# Patient Record
Sex: FEMALE | Race: WHITE | Employment: OTHER | URBAN - METROPOLITAN AREA
[De-identification: names, ages, dates, MRNs, and addresses within clinical notes are randomized per-mention and may not be internally consistent; named-entity substitution may affect disease eponyms.]

---

## 2017-03-31 ENCOUNTER — ED HISTORICAL/CONVERTED ENCOUNTER (OUTPATIENT)
Dept: OTHER | Age: 77
End: 2017-03-31

## 2017-05-26 ENCOUNTER — OP HISTORICAL/CONVERTED ENCOUNTER (OUTPATIENT)
Dept: OTHER | Age: 77
End: 2017-05-26

## 2018-03-12 ENCOUNTER — OP HISTORICAL/CONVERTED ENCOUNTER (OUTPATIENT)
Dept: OTHER | Age: 78
End: 2018-03-12

## 2018-05-07 ENCOUNTER — OP HISTORICAL/CONVERTED ENCOUNTER (OUTPATIENT)
Dept: OTHER | Age: 78
End: 2018-05-07

## 2018-09-04 ENCOUNTER — ED HISTORICAL/CONVERTED ENCOUNTER (OUTPATIENT)
Dept: OTHER | Age: 78
End: 2018-09-04

## 2020-07-27 RX ORDER — NAPROXEN 500 MG/1
500 TABLET, DELAYED RELEASE ORAL 2 TIMES DAILY WITH MEALS
Qty: 180 TAB | Refills: 0 | Status: SHIPPED | OUTPATIENT
Start: 2020-07-27 | End: 2020-10-31

## 2020-07-31 RX ORDER — METOPROLOL SUCCINATE 25 MG/1
TABLET, EXTENDED RELEASE ORAL
Qty: 90 TAB | Refills: 1 | Status: SHIPPED | OUTPATIENT
Start: 2020-07-31 | End: 2021-09-27 | Stop reason: ALTCHOICE

## 2020-08-04 VITALS
BODY MASS INDEX: 33.71 KG/M2 | WEIGHT: 183.2 LBS | DIASTOLIC BLOOD PRESSURE: 68 MMHG | HEIGHT: 62 IN | HEART RATE: 70 BPM | TEMPERATURE: 97.8 F | RESPIRATION RATE: 18 BRPM | SYSTOLIC BLOOD PRESSURE: 167 MMHG | OXYGEN SATURATION: 99 %

## 2020-08-04 PROBLEM — M15.9 DEGENERATIVE JOINT DISEASE INVOLVING MULTIPLE JOINTS: Status: ACTIVE | Noted: 2020-08-04

## 2020-08-04 PROBLEM — S39.012A LOW BACK STRAIN: Status: ACTIVE | Noted: 2020-08-04

## 2020-08-04 PROBLEM — I65.29 CAROTID ARTERY OCCLUSION: Status: ACTIVE | Noted: 2020-08-04

## 2020-08-04 PROBLEM — G62.9 SENSORY NEUROPATHY: Status: ACTIVE | Noted: 2020-08-04

## 2020-08-04 PROBLEM — J30.2 SEASONAL ALLERGIC RHINITIS: Status: ACTIVE | Noted: 2020-08-04

## 2020-08-04 PROBLEM — M54.50 CHRONIC LOW BACK PAIN: Status: ACTIVE | Noted: 2020-08-04

## 2020-08-04 PROBLEM — K57.32 DIVERTICULITIS OF COLON: Status: ACTIVE | Noted: 2020-08-04

## 2020-08-04 PROBLEM — M79.604 LOWER LIMB PAIN, INFERIOR, RIGHT: Status: ACTIVE | Noted: 2020-08-04

## 2020-08-04 PROBLEM — R51.9 HEADACHE: Status: ACTIVE | Noted: 2020-08-04

## 2020-08-04 PROBLEM — I10 ESSENTIAL HYPERTENSION: Status: ACTIVE | Noted: 2020-08-04

## 2020-08-04 PROBLEM — E78.00 PURE HYPERCHOLESTEROLEMIA: Status: ACTIVE | Noted: 2020-08-04

## 2020-08-04 PROBLEM — E55.9 VITAMIN D DEFICIENCY: Status: ACTIVE | Noted: 2020-08-04

## 2020-08-04 PROBLEM — R10.9 ABDOMINAL PAIN: Status: ACTIVE | Noted: 2020-08-04

## 2020-08-04 PROBLEM — E78.5 HYPERLIPIDEMIA: Status: ACTIVE | Noted: 2020-08-04

## 2020-08-04 PROBLEM — G89.29 CHRONIC LOW BACK PAIN: Status: ACTIVE | Noted: 2020-08-04

## 2020-09-23 ENCOUNTER — OFFICE VISIT (OUTPATIENT)
Dept: INTERNAL MEDICINE CLINIC | Age: 80
End: 2020-09-23
Payer: MEDICARE

## 2020-09-23 VITALS
RESPIRATION RATE: 18 BRPM | OXYGEN SATURATION: 96 % | SYSTOLIC BLOOD PRESSURE: 152 MMHG | DIASTOLIC BLOOD PRESSURE: 74 MMHG | TEMPERATURE: 98.3 F | WEIGHT: 169 LBS | HEART RATE: 59 BPM | HEIGHT: 63 IN | BODY MASS INDEX: 29.95 KG/M2

## 2020-09-23 DIAGNOSIS — I10 ESSENTIAL HYPERTENSION: Primary | ICD-10-CM

## 2020-09-23 DIAGNOSIS — R73.9 HYPERGLYCEMIA: ICD-10-CM

## 2020-09-23 DIAGNOSIS — E78.00 PURE HYPERCHOLESTEROLEMIA: ICD-10-CM

## 2020-09-23 PROCEDURE — G8510 SCR DEP NEG, NO PLAN REQD: HCPCS | Performed by: INTERNAL MEDICINE

## 2020-09-23 PROCEDURE — G8536 NO DOC ELDER MAL SCRN: HCPCS | Performed by: INTERNAL MEDICINE

## 2020-09-23 PROCEDURE — 1090F PRES/ABSN URINE INCON ASSESS: CPT | Performed by: INTERNAL MEDICINE

## 2020-09-23 PROCEDURE — 99214 OFFICE O/P EST MOD 30 MIN: CPT | Performed by: INTERNAL MEDICINE

## 2020-09-23 PROCEDURE — 1101F PT FALLS ASSESS-DOCD LE1/YR: CPT | Performed by: INTERNAL MEDICINE

## 2020-09-23 PROCEDURE — G8427 DOCREV CUR MEDS BY ELIG CLIN: HCPCS | Performed by: INTERNAL MEDICINE

## 2020-09-23 PROCEDURE — G8419 CALC BMI OUT NRM PARAM NOF/U: HCPCS | Performed by: INTERNAL MEDICINE

## 2020-09-23 PROCEDURE — G8400 PT W/DXA NO RESULTS DOC: HCPCS | Performed by: INTERNAL MEDICINE

## 2020-09-23 RX ORDER — LOSARTAN POTASSIUM AND HYDROCHLOROTHIAZIDE 12.5; 1 MG/1; MG/1
1 TABLET ORAL DAILY
Qty: 90 TAB | Refills: 1 | Status: SHIPPED | OUTPATIENT
Start: 2020-09-23 | End: 2021-03-18

## 2020-09-23 NOTE — PROGRESS NOTES
Merle Arenas is a 70 y.o. female and presents with Follow Up Chronic Condition; Hypertension; and Cholesterol Problem  Patient doing well follow-up here discussed last labs a year ago glucose 126 creatinine 1.1 normal hepatocellular enzymes cholesterol 247 HDL 33  we discussed low-cholesterol diet TSH 1.5 hemoglobin 11.9 she says she has occasional headaches need a refill of butalbital but otherwise doing well is on gabapentin 300 mg twice a day losartan 100 mg in the morning metoprolol succinate 25 mg a day HCTZ 12.5 mg a day she had either discontinued or stopped the antihypertensive but will restart it blood pressure has been slightly elevated she was try to get off some of her medications advised her to stop the metoprolol succinate 25 mg as she is having no palpitations that she had had in the past is doing better and it did not probably contribute much to her blood pressure but make her little tired we will also stop the sertraline 25 mg a day as its low dose she was taking it when her 's were quite ill in the hospital but she is doing better she had a hip replacement by Dr. Annie Johnson on the right conventional approach and is doing remarkably better no pain or discomfort she is status post back surgery Dr. Chloé Beyer as well and doing well she will try to lose weight diet exercise-she was agreeable for the office phlebotomist draw blood flow studies below lungs clear heart rate regular rate and rhythm overall doing better           Review of Systems  Constitutional: negative for fevers, chills, anorexia, weight loss and fatigue,no insomnia overweight  Eyes:   negative for visual disturbance and irritation, eye discharge, eye pain. no eye redness. ENT:   negative for tinnitus, sore throat, nasal congestion, ear pain, hoarseness, hearing loss.,no snoring.   Respiratory:  negative for cough, hemoptysis, shortness of breath, wheezing,  CV:   negative for chest pain, palpitations, lower extremity edema, shortness of breath while sitting, walking or at night  GI:   negative for nausea, vomiting, diarrhea, abdominal pain,melena,constipation. Endo:               negative for polyuria, polydipsia, polyphagia, cold or heat intolerance,hair loss. Genitourinary: negative for frequency, dysuria and hematuria,urethral discharge,nocturia.straining while urination,urinary incontinence. Integument:  negative for rash and pruritus  Hematologic:  negative for easy bruising and gum/nose bleeding, enlarged nodes  Musculoskel: negative for myalgias, arthralgias, back pain, muscle weakness, joint pain, h/o fall,cramps,calf pain. Degenerative arthritis  Neurological:  negative for headaches, dizziness, vertigo, memory problems, gait and seizures loss of consciousness,no ataxia.   Behavl/Psych: negative for feelings of anxiety, depression, mood changes ,sadness    Past Medical History:   Diagnosis Date    Abdominal pain 8/4/2020    Carotid artery occlusion 8/4/2020    Chronic low back pain 8/4/2020    Degenerative joint disease involving multiple joints 8/4/2020    Diverticulitis of colon 8/4/2020    Essential hypertension 8/4/2020    Headache 8/4/2020    Hyperlipidemia 8/4/2020    Low back strain 8/4/2020    Lower limb pain, inferior, right 8/4/2020    Pure hypercholesterolemia 8/4/2020    Seasonal allergic rhinitis 8/4/2020    Sensory neuropathy 8/4/2020    Vitamin D deficiency 8/4/2020     Past Surgical History:   Procedure Laterality Date    HX COLONOSCOPY  01/22/2013    HX KNEE REPLACEMENT Right 08/01/2016     Social History     Socioeconomic History    Marital status:      Spouse name: Not on file    Number of children: Not on file    Years of education: Not on file    Highest education level: Not on file   Tobacco Use    Smoking status: Never Smoker    Smokeless tobacco: Never Used   Substance and Sexual Activity    Alcohol use: Never     Frequency: Never     Family History   Family history unknown: Yes     Current Outpatient Medications   Medication Sig Dispense Refill    butalb/acetaminophen/caffeine (BUTALBITAL-ACETAMINOPHEN-CAFF PO) Take 1 Tab by mouth two (2) times daily as needed.  losartan-hydroCHLOROthiazide (HYZAAR) 100-12.5 mg per tablet Take 1 Tab by mouth daily for 180 days. 90 Tab 1    metoprolol succinate (TOPROL-XL) 25 mg XL tablet take 1 tablet by mouth once daily 90 Tab 1    naproxen EC (NAPROSYN EC) 500 mg EC tablet Take 1 Tab by mouth two (2) times daily (with meals) for 90 days. 180 Tab 0     Allergies   Allergen Reactions    Darvocet A500 [Propoxyphene N-Acetaminophen] Nausea and Vomiting       Objective:  Visit Vitals  BP (!) 152/74 (BP 1 Location: Right arm, BP Patient Position: Sitting)   Pulse (!) 59   Temp 98.3 °F (36.8 °C) (Oral)   Resp 18   Ht 5' 3\" (1.6 m)   Wt 169 lb (76.7 kg)   SpO2 96%   BMI 29.94 kg/m²       Physical Exam:   Constitutional: General Appearance: healthy-appearing and obese. Level of Distress: NAD. Ambulation: ambulating normally. Overweight  Psychiatric: Mental Status: normal mood and affect and active and alert. Orientation: to time, place, and person. no agitation. ,normal eye contact. normal insight  Head: Head: normocephalic and atraumatic. Eyes: Pupils: PERRLA. Sclerae: non-icteric. ENMT: No lesions on external ear, no hearing loss. No lesions on external nose, sinus tenderness, or nasal discharge. Lips, Teeth, and no mouth or lip ulcers   Neck: Neck: supple, trachea midline, and no masses. Lymph Nodes: no cervical LAD. Thyroid: no enlargement or nodules and non-tender. Lungs: Respiratory effort: no dyspnea. Auscultation: no wheezing, rales/crackles, or rhonchi and breath sounds normal and good air movement. Cardiovascular: Apical Impulse: not displaced. Heart Auscultation: normal S1 and S2; no murmurs, rubs, or gallops; and RRR. Neck vessels: no carotid bruits. Pulses including femoral / pedal: normal throughout. Abdomen:  Bowel Sounds: normal. Inspection and Palpation: no tenderness, guarding, or masses and soft and non-distended. Liver: non-tender and no hepatomegaly. Spleen: non-tender and no splenomegaly. Musculoskeletal[de-identified] Extremities: no edema or varicosities. Calf tenderness. DJD changes  Neurologic: Gait and Station: normal gait and station. Motor Strength normal right and left. Sensory and cerebellar intact. Skin: Inspection and palpation: no rash, lesions, or ulcer. No results found for this or any previous visit. Assessment/Plan:    ICD-10-CM ICD-9-CM    1. Essential hypertension  I10 401.9 CBC WITH AUTOMATED DIFF      METABOLIC PANEL, COMPREHENSIVE   2. Hyperglycemia  R73.9 790.29 HEMOGLOBIN A1C WITH EAG   3. Pure hypercholesterolemia  E78.00 272.0 LIPID PANEL     Orders Placed This Encounter    HEMOGLOBIN A1C WITH EAG    LIPID PANEL    CBC WITH AUTOMATED DIFF    METABOLIC PANEL, COMPREHENSIVE    butalb/acetaminophen/caffeine (BUTALBITAL-ACETAMINOPHEN-CAFF PO)     Sig: Take 1 Tab by mouth two (2) times daily as needed.  losartan-hydroCHLOROthiazide (HYZAAR) 100-12.5 mg per tablet     Sig: Take 1 Tab by mouth daily for 180 days. Dispense:  90 Tab     Refill:  1       lose weight, follow low fat diet, continue present plan, routine labs ordered, call if any problems    There are no Patient Instructions on file for this visit. Follow-up and Dispositions    · Return in about 6 months (around 3/23/2021).

## 2020-09-24 LAB
ALBUMIN SERPL-MCNC: 4.6 G/DL (ref 3.7–4.7)
ALBUMIN/GLOB SERPL: 2 {RATIO} (ref 1.2–2.2)
ALP SERPL-CCNC: 82 IU/L (ref 39–117)
ALT SERPL-CCNC: 11 IU/L (ref 0–32)
AST SERPL-CCNC: 16 IU/L (ref 0–40)
BASOPHILS # BLD AUTO: 0.1 X10E3/UL (ref 0–0.2)
BASOPHILS NFR BLD AUTO: 1 %
BILIRUB SERPL-MCNC: <0.2 MG/DL (ref 0–1.2)
BUN SERPL-MCNC: 28 MG/DL (ref 8–27)
BUN/CREAT SERPL: 23 (ref 12–28)
CALCIUM SERPL-MCNC: 10.3 MG/DL (ref 8.7–10.3)
CHLORIDE SERPL-SCNC: 106 MMOL/L (ref 96–106)
CHOLEST SERPL-MCNC: 250 MG/DL (ref 100–199)
CO2 SERPL-SCNC: 24 MMOL/L (ref 20–29)
CREAT SERPL-MCNC: 1.22 MG/DL (ref 0.57–1)
EOSINOPHIL # BLD AUTO: 0.3 X10E3/UL (ref 0–0.4)
EOSINOPHIL NFR BLD AUTO: 4 %
ERYTHROCYTE [DISTWIDTH] IN BLOOD BY AUTOMATED COUNT: 13.4 % (ref 11.7–15.4)
EST. AVERAGE GLUCOSE BLD GHB EST-MCNC: 134 MG/DL
GLOBULIN SER CALC-MCNC: 2.3 G/DL (ref 1.5–4.5)
GLUCOSE SERPL-MCNC: 115 MG/DL (ref 65–99)
HBA1C MFR BLD: 6.3 % (ref 4.8–5.6)
HCT VFR BLD AUTO: 35.8 % (ref 34–46.6)
HDLC SERPL-MCNC: 41 MG/DL
HGB BLD-MCNC: 12.2 G/DL (ref 11.1–15.9)
IMM GRANULOCYTES # BLD AUTO: 0 X10E3/UL (ref 0–0.1)
IMM GRANULOCYTES NFR BLD AUTO: 0 %
LDLC SERPL CALC-MCNC: 180 MG/DL (ref 0–99)
LYMPHOCYTES # BLD AUTO: 1.7 X10E3/UL (ref 0.7–3.1)
LYMPHOCYTES NFR BLD AUTO: 29 %
MCH RBC QN AUTO: 31 PG (ref 26.6–33)
MCHC RBC AUTO-ENTMCNC: 34.1 G/DL (ref 31.5–35.7)
MCV RBC AUTO: 91 FL (ref 79–97)
MONOCYTES # BLD AUTO: 0.4 X10E3/UL (ref 0.1–0.9)
MONOCYTES NFR BLD AUTO: 7 %
NEUTROPHILS # BLD AUTO: 3.6 X10E3/UL (ref 1.4–7)
NEUTROPHILS NFR BLD AUTO: 59 %
PLATELET # BLD AUTO: 313 X10E3/UL (ref 150–450)
POTASSIUM SERPL-SCNC: 4.7 MMOL/L (ref 3.5–5.2)
PROT SERPL-MCNC: 6.9 G/DL (ref 6–8.5)
RBC # BLD AUTO: 3.93 X10E6/UL (ref 3.77–5.28)
SODIUM SERPL-SCNC: 143 MMOL/L (ref 134–144)
TRIGL SERPL-MCNC: 159 MG/DL (ref 0–149)
VLDLC SERPL CALC-MCNC: 29 MG/DL (ref 5–40)
WBC # BLD AUTO: 6.1 X10E3/UL (ref 3.4–10.8)

## 2020-09-25 NOTE — PROGRESS NOTES
This results are okay but cholesterol a little high at 250. But I am concerned the cholesterol medication or adjustment would probably cause more discomfort than just trying diet and exercise alone losing a few pounds avoiding high cholesterol high fat diet. We can try that first and recheck it on return hemoglobin A1c fairly good at 6. 3

## 2020-09-29 NOTE — PROGRESS NOTES
This result is abnormal.  Labs okay but cholesterol high at 250 we can consider medication medication adjustment etc. if she wishes we can start additional cholesterol-lowering drug

## 2020-10-14 ENCOUNTER — TELEPHONE (OUTPATIENT)
Dept: INTERNAL MEDICINE CLINIC | Age: 80
End: 2020-10-14

## 2020-10-15 RX ORDER — BUTALBITAL, ACETAMINOPHEN AND CAFFEINE 50; 325; 40 MG/1; MG/1; MG/1
TABLET ORAL
Qty: 60 TAB | Refills: 0 | Status: SHIPPED | OUTPATIENT
Start: 2020-10-15 | End: 2021-05-14

## 2020-10-31 DIAGNOSIS — G62.9 SENSORIMOTOR NEUROPATHY: Primary | ICD-10-CM

## 2020-10-31 RX ORDER — NAPROXEN 500 MG/1
TABLET, DELAYED RELEASE ORAL
Qty: 180 TAB | Refills: 0 | Status: SHIPPED | OUTPATIENT
Start: 2020-10-31 | End: 2021-02-02

## 2020-10-31 RX ORDER — GABAPENTIN 300 MG/1
CAPSULE ORAL
Qty: 180 CAP | Refills: 0 | Status: SHIPPED | OUTPATIENT
Start: 2020-10-31 | End: 2021-02-04

## 2021-02-02 RX ORDER — NAPROXEN 500 MG/1
TABLET, DELAYED RELEASE ORAL
Qty: 180 TAB | Refills: 0 | Status: SHIPPED | OUTPATIENT
Start: 2021-02-02 | End: 2021-07-27

## 2021-02-04 DIAGNOSIS — G62.9 SENSORIMOTOR NEUROPATHY: ICD-10-CM

## 2021-02-04 RX ORDER — GABAPENTIN 300 MG/1
300 CAPSULE ORAL 2 TIMES DAILY
Qty: 180 CAP | Refills: 0 | Status: SHIPPED | OUTPATIENT
Start: 2021-02-04 | End: 2021-05-24

## 2021-03-18 RX ORDER — LOSARTAN POTASSIUM AND HYDROCHLOROTHIAZIDE 12.5; 1 MG/1; MG/1
TABLET ORAL
Qty: 90 TAB | Refills: 1 | Status: SHIPPED | OUTPATIENT
Start: 2021-03-18 | End: 2021-09-13

## 2021-03-24 ENCOUNTER — OFFICE VISIT (OUTPATIENT)
Dept: INTERNAL MEDICINE CLINIC | Age: 81
End: 2021-03-24
Payer: MEDICARE

## 2021-03-24 VITALS
BODY MASS INDEX: 35.15 KG/M2 | RESPIRATION RATE: 18 BRPM | WEIGHT: 191 LBS | HEIGHT: 62 IN | SYSTOLIC BLOOD PRESSURE: 162 MMHG | TEMPERATURE: 98.6 F | HEART RATE: 84 BPM | DIASTOLIC BLOOD PRESSURE: 78 MMHG | OXYGEN SATURATION: 99 %

## 2021-03-24 DIAGNOSIS — E78.00 PURE HYPERCHOLESTEROLEMIA: ICD-10-CM

## 2021-03-24 DIAGNOSIS — I10 ESSENTIAL HYPERTENSION: Primary | ICD-10-CM

## 2021-03-24 DIAGNOSIS — K57.32 DIVERTICULITIS OF COLON: ICD-10-CM

## 2021-03-24 DIAGNOSIS — M15.3 OTHER SECONDARY OSTEOARTHRITIS OF MULTIPLE SITES: ICD-10-CM

## 2021-03-24 DIAGNOSIS — M65.839 EXTENSOR TENOSYNOVITIS OF WRIST, UNSPECIFIED LATERALITY: ICD-10-CM

## 2021-03-24 PROCEDURE — G8417 CALC BMI ABV UP PARAM F/U: HCPCS | Performed by: INTERNAL MEDICINE

## 2021-03-24 PROCEDURE — G8510 SCR DEP NEG, NO PLAN REQD: HCPCS | Performed by: INTERNAL MEDICINE

## 2021-03-24 PROCEDURE — 1090F PRES/ABSN URINE INCON ASSESS: CPT | Performed by: INTERNAL MEDICINE

## 2021-03-24 PROCEDURE — G8400 PT W/DXA NO RESULTS DOC: HCPCS | Performed by: INTERNAL MEDICINE

## 2021-03-24 PROCEDURE — G8753 SYS BP > OR = 140: HCPCS | Performed by: INTERNAL MEDICINE

## 2021-03-24 PROCEDURE — 99214 OFFICE O/P EST MOD 30 MIN: CPT | Performed by: INTERNAL MEDICINE

## 2021-03-24 PROCEDURE — 1101F PT FALLS ASSESS-DOCD LE1/YR: CPT | Performed by: INTERNAL MEDICINE

## 2021-03-24 PROCEDURE — G8754 DIAS BP LESS 90: HCPCS | Performed by: INTERNAL MEDICINE

## 2021-03-24 PROCEDURE — G8427 DOCREV CUR MEDS BY ELIG CLIN: HCPCS | Performed by: INTERNAL MEDICINE

## 2021-03-24 PROCEDURE — G8536 NO DOC ELDER MAL SCRN: HCPCS | Performed by: INTERNAL MEDICINE

## 2021-03-24 RX ORDER — INDOMETHACIN 25 MG/1
25 CAPSULE ORAL
Qty: 60 CAP | Refills: 0 | Status: SHIPPED | OUTPATIENT
Start: 2021-03-24 | End: 2021-04-29

## 2021-03-24 NOTE — PROGRESS NOTES
Calista Harding is a 68 y.o. 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 Ashley Vaca orthopedist and has been diagnosed with trochanteric bursitis on the right and did have a steroid shot still has some intermittent discomfort has psoriasis and is on topical agents which is improving with noted back lesion I advised her that psoriasis can also cause arthritis. Blood pressure 158/88 resting she continues on naproxen and very rarely takes butalbital for headache she continues on losartan HCTZ for essential hypertension and did not want to change the medication at this time we did review her last labs hemoglobin A1c 6.3 last November cholesterol 250  white count 6100 hemoglobin 12.2 creatinine 1.22 glucose 115 potassium 4.7 calcium 10.3 SGOT of 16-she has symptoms referable to degenerative arthritis of the hands and suggestion of positive Finkelstein test for Western of synovitis. She will try short course of indomethacin in place of the naproxen before further evaluation by orthopedist           Review of Systems  Constitutional: negative for fevers, chills, anorexia, weight loss and fatigue,no insomnia  Eyes:   negative for visual disturbance and irritation, eye discharge, eye pain. no eye redness. ENT:   negative for tinnitus, sore throat, nasal congestion, ear pain, hoarseness, hearing loss.,no snoring.   Respiratory:  negative for cough, hemoptysis, shortness of breath, wheezing,  CV:   negative for chest pain, palpitations, lower extremity edema, shortness of breath while sitting, walking or at night  GI:   negative for nausea, vomiting, diarrhea, abdominal pain,melena,constipation. Endo:               negative for polyuria, polydipsia, polyphagia, cold or heat intolerance,hair loss. Genitourinary: negative for frequency, dysuria and hematuria,urethral discharge,nocturia.straining while urination,urinary incontinence. Integument:  negative for rash and pruritus  Hematologic:  negative for easy bruising and gum/nose bleeding, enlarged nodes  Musculoskel: negative for myalgias, arthralgias, back pain, muscle weakness, occasional joint pain, h/o fall,cramps,calf pain. Tendinitis wrist-degenerative arthritis  Neurological:  negative for headaches, dizziness, vertigo, memory problems, gait and seizures loss of consciousness,no ataxia.   Behavl/Psych: negative for feelings of anxiety, depression, mood changes ,sadness    Past Medical History:   Diagnosis Date    Abdominal pain 8/4/2020    Carotid artery occlusion 8/4/2020    Chronic low back pain 8/4/2020    Degenerative joint disease involving multiple joints 8/4/2020    Diverticulitis of colon 8/4/2020    Essential hypertension 8/4/2020    Headache 8/4/2020    Hyperlipidemia 8/4/2020    Low back strain 8/4/2020    Lower limb pain, inferior, right 8/4/2020    Pure hypercholesterolemia 8/4/2020    Seasonal allergic rhinitis 8/4/2020    Sensory neuropathy 8/4/2020    Vitamin D deficiency 8/4/2020     Past Surgical History:   Procedure Laterality Date    HX COLONOSCOPY  01/22/2013    HX KNEE REPLACEMENT Right 08/01/2016     Social History     Socioeconomic History    Marital status:      Spouse name: Not on file    Number of children: Not on file    Years of education: Not on file    Highest education level: Not on file   Tobacco Use    Smoking status: Never Smoker    Smokeless tobacco: Never Used   Substance and Sexual Activity    Alcohol use: Never     Frequency: Never     Family History   Family history unknown: Yes     Current Outpatient Medications   Medication Sig Dispense Refill    indomethacin (INDOCIN) 25 mg capsule Take 1 Cap by mouth Before breakfast and dinner for 30 days. 60 Cap 0    losartan-hydroCHLOROthiazide (HYZAAR) 100-12.5 mg per tablet take 1 tablet by mouth once daily 90 Tab 1    gabapentin (NEURONTIN) 300 mg capsule Take 1 Cap by mouth two (2) times a day for 90 days. Max Daily Amount: 600 mg. 180 Cap 0    naproxen EC (NAPROSYN EC) 500 mg EC tablet take 1 tablet by mouth TWO TIMES DAILY (WITH MEALS) FOR 90 DAYS 180 Tab 0    butalbital-acetaminophen-caffeine (FIORICET, ESGIC) -40 mg per tablet TAKE 1 TABLET BY MOUTH THREE TIMES A DAY AS NEEDED FOR HEADACHE 60 Tab 0    metoprolol succinate (TOPROL-XL) 25 mg XL tablet take 1 tablet by mouth once daily 90 Tab 1     Allergies   Allergen Reactions    Darvocet A500 [Propoxyphene N-Acetaminophen] Nausea and Vomiting       Objective:  Visit Vitals  BP (!) 162/78 (BP 1 Location: Right arm, BP Patient Position: Sitting, BP Cuff Size: Adult)   Pulse 84   Temp 98.6 °F (37 °C) (Oral)   Resp 18   Ht 5' 2\" (1.575 m)   Wt 191 lb (86.6 kg)   SpO2 99%   BMI 34.93 kg/m²       Physical Exam:   Constitutional: General Appearance: healthy-appearing and obese. Level of Distress: NAD. Ambulation: ambulating normally. Psychiatric: Mental Status: normal mood and affect and active and alert. Orientation: to time, place, and person. no agitation. ,normal eye contact. normal insight  Head: Head: normocephalic and atraumatic. Eyes: Pupils: PERRLA. Sclerae: non-icteric. ENMT: No lesions on external ear, no hearing loss. No lesions on external nose, sinus tenderness, or nasal discharge. Lips, Teeth, and no mouth or lip ulcers   Neck: Neck: supple, trachea midline, and no masses. Lymph Nodes: no cervical LAD. Thyroid: no enlargement or nodules and non-tender. Lungs: Respiratory effort: no dyspnea. Auscultation: no wheezing, rales/crackles, or rhonchi and breath sounds normal and good air movement. Cardiovascular: Apical Impulse: not displaced. Heart Auscultation: normal S1 and S2; no murmurs, rubs, or gallops; and RRR. Neck vessels: no carotid bruits. Pulses including femoral / pedal: normal throughout. Abdomen: Bowel Sounds: normal. Inspection and Palpation: no tenderness, guarding, or masses and soft and non-distended. Liver: non-tender  Musculoskeletal[de-identified] Extremities: no edema or varicosities. Calf tenderness. DJD changes tendinitis wrist limited range of motion due to pain  Neurologic: Gait and Station: normal gait and station. Motor Strength normal right and left. Sensory and cerebellar intact. Skin: Inspection and palpation: no rash, lesions, or ulcer. Results for orders placed or performed in visit on 09/23/20   HEMOGLOBIN A1C WITH EAG   Result Value Ref Range    Hemoglobin A1c 6.3 (H) 4.8 - 5.6 %    Estimated average glucose 134 mg/dL   LIPID PANEL   Result Value Ref Range    Cholesterol, total 250 (H) 100 - 199 mg/dL    Triglyceride 159 (H) 0 - 149 mg/dL    HDL Cholesterol 41 >39 mg/dL    VLDL, calculated 29 5 - 40 mg/dL    LDL, calculated 180 (H) 0 - 99 mg/dL   CBC WITH AUTOMATED DIFF   Result Value Ref Range    WBC 6.1 3.4 - 10.8 x10E3/uL    RBC 3.93 3.77 - 5.28 x10E6/uL    HGB 12.2 11.1 - 15.9 g/dL    HCT 35.8 34.0 - 46.6 %    MCV 91 79 - 97 fL    MCH 31.0 26.6 - 33.0 pg    MCHC 34.1 31.5 - 35.7 g/dL    RDW 13.4 11.7 - 15.4 %    PLATELET 557 470 - 780 x10E3/uL    NEUTROPHILS 59 Not Estab. %    Lymphocytes 29 Not Estab. %    MONOCYTES 7 Not Estab. %    EOSINOPHILS 4 Not Estab. %    BASOPHILS 1 Not Estab. %    ABS. NEUTROPHILS 3.6 1.4 - 7.0 x10E3/uL    Abs Lymphocytes 1.7 0.7 - 3.1 x10E3/uL    ABS. MONOCYTES 0.4 0.1 - 0.9 x10E3/uL    ABS. EOSINOPHILS 0.3 0.0 - 0.4 x10E3/uL    ABS. BASOPHILS 0.1 0.0 - 0.2 x10E3/uL    IMMATURE GRANULOCYTES 0 Not Estab. %    ABS. IMM.  GRANS. 0.0 0.0 - 0.1 [id number]/FE   METABOLIC PANEL, COMPREHENSIVE   Result Value Ref Range    Glucose 115 (H) 65 - 99 mg/dL    BUN 28 (H) 8 - 27 mg/dL    Creatinine 1.22 (H) 0.57 - 1.00 mg/dL    GFR est non-AA 43 (L) >59 mL/min/1.73    GFR est AA 49 (L) >59 mL/min/1.73    BUN/Creatinine ratio 23 12 - 28    Sodium 143 134 - 144 mmol/L    Potassium 4.7 3.5 - 5.2 mmol/L    Chloride 106 96 - 106 mmol/L    CO2 24 20 - 29 mmol/L    Calcium 10.3 8.7 - 10.3 mg/dL    Protein, total 6.9 6.0 - 8.5 g/dL    Albumin 4.6 3.7 - 4.7 g/dL    GLOBULIN, TOTAL 2.3 1.5 - 4.5 g/dL    A-G Ratio 2.0 1.2 - 2.2    Bilirubin, total <0.2 0.0 - 1.2 mg/dL    Alk. phosphatase 82 39 - 117 IU/L    AST (SGOT) 16 0 - 40 IU/L    ALT (SGPT) 11 0 - 32 IU/L       Assessment/Plan:    ICD-10-CM ICD-9-CM    1. Essential hypertension  I10 401.9    2. Diverticulitis of colon  K57.32 562.11    3. Pure hypercholesterolemia  E78.00 272.0    4. Extensor tenosynovitis of wrist, unspecified laterality  M65.839 727.05    5. Other secondary osteoarthritis of multiple sites  M15.3 715.89      Orders Placed This Encounter    indomethacin (INDOCIN) 25 mg capsule     Sig: Take 1 Cap by mouth Before breakfast and dinner for 30 days. Dispense:  60 Cap     Refill:  0     Hold naproxen while on Indomethacine       call if any problems    There are no Patient Instructions on file for this visit. Follow-up and Dispositions    · Return in about 6 months (around 9/24/2021).

## 2021-03-24 NOTE — PROGRESS NOTES
1. Have you been to the ER, urgent care clinic since your last visit? Hospitalized since your last visit? No    2. Have you seen or consulted any other health care providers outside of the [address] [place] [address] since your last visit? Include any pap smears or colon screening.  No     Chief Complaint   Patient presents with    Follow Up Chronic Condition    Hypertension    Cholesterol Problem     Visit Vitals  BP (!) 164/78 (BP 1 Location: Right arm, BP Patient Position: Sitting, BP Cuff Size: Adult)   Pulse 84   Temp 98.6 °F (37 °C) (Oral)   Resp 18   Ht 5' 2\" (1.575 m)   Wt 191 lb (86.6 kg)   SpO2 99%   BMI 34.93 kg/m²

## 2021-04-29 RX ORDER — INDOMETHACIN 25 MG/1
CAPSULE ORAL
Qty: 60 CAP | Refills: 0 | Status: SHIPPED | OUTPATIENT
Start: 2021-04-29 | End: 2021-09-27 | Stop reason: ALTCHOICE

## 2021-05-14 RX ORDER — BUTALBITAL, ACETAMINOPHEN AND CAFFEINE 50; 325; 40 MG/1; MG/1; MG/1
TABLET ORAL
Qty: 60 TAB | Refills: 0 | Status: SHIPPED | OUTPATIENT
Start: 2021-05-14 | End: 2021-09-27 | Stop reason: ALTCHOICE

## 2021-05-24 DIAGNOSIS — G62.9 SENSORIMOTOR NEUROPATHY: ICD-10-CM

## 2021-05-24 RX ORDER — GABAPENTIN 300 MG/1
300 CAPSULE ORAL 2 TIMES DAILY
Qty: 180 CAPSULE | Refills: 0 | Status: SHIPPED | OUTPATIENT
Start: 2021-05-24 | End: 2021-09-15

## 2021-06-06 ENCOUNTER — APPOINTMENT (OUTPATIENT)
Dept: GENERAL RADIOLOGY | Age: 81
End: 2021-06-06
Attending: EMERGENCY MEDICINE
Payer: MEDICARE

## 2021-06-06 ENCOUNTER — HOSPITAL ENCOUNTER (EMERGENCY)
Age: 81
Discharge: HOME | End: 2021-06-06
Attending: EMERGENCY MEDICINE
Payer: MEDICARE

## 2021-06-06 VITALS
BODY MASS INDEX: 34.04 KG/M2 | WEIGHT: 185 LBS | HEIGHT: 62 IN | HEART RATE: 98 BPM | RESPIRATION RATE: 16 BRPM | TEMPERATURE: 98 F | DIASTOLIC BLOOD PRESSURE: 67 MMHG | OXYGEN SATURATION: 100 % | SYSTOLIC BLOOD PRESSURE: 148 MMHG

## 2021-06-06 DIAGNOSIS — M79.5 FOREIGN BODY (FB) IN SOFT TISSUE: Primary | ICD-10-CM

## 2021-06-06 PROCEDURE — 99283 EMERGENCY DEPT VISIT LOW MDM: CPT

## 2021-06-06 PROCEDURE — 74011000250 HC RX REV CODE- 250: Performed by: EMERGENCY MEDICINE

## 2021-06-06 PROCEDURE — 73630 X-RAY EXAM OF FOOT: CPT

## 2021-06-06 RX ORDER — BACITRACIN 500 [USP'U]/G
OINTMENT TOPICAL
Status: DISCONTINUED | OUTPATIENT
Start: 2021-06-06 | End: 2021-06-06

## 2021-06-06 RX ADMIN — BACITRACIN ZINC, NEOMYCIN SULFATE, POLYMYXIN B SULFATE 1 PACKET: 400; 3.5; 5 OINTMENT TOPICAL at 13:00

## 2021-06-06 NOTE — ED PROVIDER NOTES
EMERGENCY DEPARTMENT HISTORY AND PHYSICAL EXAM      Date: 6/6/2021  Patient Name: Jonna Lehman    History of Presenting Illness     Chief Complaint   Patient presents with    Foot Pain       History Provided By: Patient    HPI: Jonna Lehman, 68 y.o. female with a past medical history significant hypertension and DJD presents to the ED with cc of stepped on tip of insulin needle this am and it's stuck in her right heel. Feels pain with ambulation. PCP: Sonny Dorsey MD    No current facility-administered medications on file prior to encounter. Current Outpatient Medications on File Prior to Encounter   Medication Sig Dispense Refill    gabapentin (NEURONTIN) 300 mg capsule Take 1 Capsule by mouth two (2) times a day for 90 days.  Max Daily Amount: 600 mg. 180 Capsule 0    butalbital-acetaminophen-caffeine (FIORICET, ESGIC) -40 mg per tablet TAKE 1 TABLET BY MOUTH THREE TIMES A DAY AS NEEDED FOR HEADACHE 60 Tab 0    indomethacin (INDOCIN) 25 mg capsule take 1 capsule by mouth BEFORE BREAKFAST AND DINNER FOR 30 DAYS 60 Cap 0    losartan-hydroCHLOROthiazide (HYZAAR) 100-12.5 mg per tablet take 1 tablet by mouth once daily 90 Tab 1    naproxen EC (NAPROSYN EC) 500 mg EC tablet take 1 tablet by mouth TWO TIMES DAILY (WITH MEALS) FOR 90 DAYS 180 Tab 0    metoprolol succinate (TOPROL-XL) 25 mg XL tablet take 1 tablet by mouth once daily 90 Tab 1       Past History     Past Medical History: Tetanus UTD  Past Medical History:   Diagnosis Date    Abdominal pain 8/4/2020    Carotid artery occlusion 8/4/2020    Chronic low back pain 8/4/2020    Degenerative joint disease involving multiple joints 8/4/2020    Diverticulitis of colon 8/4/2020    Essential hypertension 8/4/2020    Headache 8/4/2020    Hyperlipidemia 8/4/2020    Low back strain 8/4/2020    Lower limb pain, inferior, right 8/4/2020    Pure hypercholesterolemia 8/4/2020    Seasonal allergic rhinitis 8/4/2020    Sensory neuropathy 8/4/2020    Vitamin D deficiency 8/4/2020       Past Surgical History:  Past Surgical History:   Procedure Laterality Date    HX COLONOSCOPY  01/22/2013    HX KNEE REPLACEMENT Right 08/01/2016       Family History:  Family History   Family history unknown: Yes       Social History:  Social History     Tobacco Use    Smoking status: Never Smoker    Smokeless tobacco: Never Used   Substance Use Topics    Alcohol use: Never    Drug use: Not on file       Allergies: Allergies   Allergen Reactions    Latex Rash    Darvocet A500 [Propoxyphene N-Acetaminophen] Nausea and Vomiting         Review of Systems   Review of Systems   Musculoskeletal: Negative for gait problem. Neurological: Negative for numbness. All other systems reviewed and are negative. Physical Exam   Physical Exam  Vitals and nursing note reviewed. Constitutional:       Appearance: Normal appearance. Musculoskeletal:         General: Swelling present. Comments: FROM in  R ankle and toes. Mild edema of heel   Skin:     General: Skin is warm and dry. Capillary Refill: Capillary refill takes less than 2 seconds. Comments: Right heel with puncture wound, TTP. Neurological:      General: No focal deficit present. Mental Status: She is alert. Diagnostic Study Results     Labs -   No results found for this or any previous visit (from the past 12 hour(s)). Radiologic Studies -   XR FOOT RT MIN 3 V   Final Result   1. Linear metallic foreign body, consistent with needle fragment, approximately   10 mm in length, posterior plantar soft tissues near the posterior calcaneus. 2. Posterior and plantar calcaneal spurring. 3. Mild joint space narrowing of interphalangeal joints. CT Results  (Last 48 hours)    None        CXR Results  (Last 48 hours)    None            Medical Decision Making   I am the first provider for this patient.     I reviewed the vital signs, available nursing notes, past medical history, past surgical history, family history and social history. Vital Signs-Reviewed the patient's vital signs. Patient Vitals for the past 12 hrs:   Temp Pulse Resp BP SpO2   06/06/21 1034 98 °F (36.7 °C) 98 16 (!) 148/67 100 %       Records Reviewed: Nursing Notes    Provider Notes (Medical Decision Making): Impression: FB right heel      ED Course:   Initial assessment performed. The patients presenting problems have been discussed, and they are in agreement with the care plan formulated and outlined with them. I have encouraged them to ask questions as they arise throughout their visit. Xrays above. Pt advised of local wound care, need for Ortho referral for xray guided removal    PLAN:  1. Current Discharge Medication List        2. Follow-up Information     Follow up With Specialties Details Why Contact Info    Omar Ryan MD Orthopedic Surgery   [address]  [phone]          Return to ED if worse     Diagnosis     Clinical Impression:   1.  Foreign body (FB) in soft tissue

## 2021-07-27 RX ORDER — NAPROXEN 500 MG/1
TABLET, DELAYED RELEASE ORAL
Qty: 180 TABLET | Refills: 0 | Status: SHIPPED | OUTPATIENT
Start: 2021-07-27

## 2021-08-20 ENCOUNTER — IMMUNIZATION (OUTPATIENT)
Dept: INTERNAL MEDICINE CLINIC | Age: 81
End: 2021-08-20

## 2021-09-15 DIAGNOSIS — G62.9 SENSORIMOTOR NEUROPATHY: ICD-10-CM

## 2021-09-15 RX ORDER — GABAPENTIN 300 MG/1
300 CAPSULE ORAL 2 TIMES DAILY
Qty: 180 CAPSULE | Refills: 0 | Status: SHIPPED | OUTPATIENT
Start: 2021-09-15 | End: 2021-12-14

## 2021-09-27 ENCOUNTER — OFFICE VISIT (OUTPATIENT)
Dept: INTERNAL MEDICINE CLINIC | Age: 81
End: 2021-09-27
Payer: MEDICARE

## 2021-09-27 VITALS
BODY MASS INDEX: 33.42 KG/M2 | SYSTOLIC BLOOD PRESSURE: 132 MMHG | WEIGHT: 177 LBS | HEIGHT: 61 IN | RESPIRATION RATE: 18 BRPM | TEMPERATURE: 98.3 F | OXYGEN SATURATION: 96 % | HEART RATE: 67 BPM | DIASTOLIC BLOOD PRESSURE: 66 MMHG

## 2021-09-27 DIAGNOSIS — M17.12 PRIMARY OSTEOARTHRITIS OF LEFT KNEE: ICD-10-CM

## 2021-09-27 DIAGNOSIS — R73.9 HYPERGLYCEMIA: ICD-10-CM

## 2021-09-27 DIAGNOSIS — Z12.11 COLON CANCER SCREENING: ICD-10-CM

## 2021-09-27 DIAGNOSIS — M15.8 OTHER OSTEOARTHRITIS INVOLVING MULTIPLE JOINTS: ICD-10-CM

## 2021-09-27 DIAGNOSIS — I10 ESSENTIAL HYPERTENSION: Primary | ICD-10-CM

## 2021-09-27 DIAGNOSIS — E78.00 PURE HYPERCHOLESTEROLEMIA: ICD-10-CM

## 2021-09-27 DIAGNOSIS — G62.9 SENSORY NEUROPATHY: ICD-10-CM

## 2021-09-27 DIAGNOSIS — E78.1 PURE HYPERTRIGLYCERIDEMIA: ICD-10-CM

## 2021-09-27 DIAGNOSIS — K57.32 DIVERTICULITIS OF COLON: ICD-10-CM

## 2021-09-27 PROCEDURE — 1090F PRES/ABSN URINE INCON ASSESS: CPT | Performed by: INTERNAL MEDICINE

## 2021-09-27 PROCEDURE — 99214 OFFICE O/P EST MOD 30 MIN: CPT | Performed by: INTERNAL MEDICINE

## 2021-09-27 PROCEDURE — 1101F PT FALLS ASSESS-DOCD LE1/YR: CPT | Performed by: INTERNAL MEDICINE

## 2021-09-27 PROCEDURE — G8400 PT W/DXA NO RESULTS DOC: HCPCS | Performed by: INTERNAL MEDICINE

## 2021-09-27 PROCEDURE — G8417 CALC BMI ABV UP PARAM F/U: HCPCS | Performed by: INTERNAL MEDICINE

## 2021-09-27 PROCEDURE — G8752 SYS BP LESS 140: HCPCS | Performed by: INTERNAL MEDICINE

## 2021-09-27 PROCEDURE — G8536 NO DOC ELDER MAL SCRN: HCPCS | Performed by: INTERNAL MEDICINE

## 2021-09-27 PROCEDURE — G8432 DEP SCR NOT DOC, RNG: HCPCS | Performed by: INTERNAL MEDICINE

## 2021-09-27 PROCEDURE — G8427 DOCREV CUR MEDS BY ELIG CLIN: HCPCS | Performed by: INTERNAL MEDICINE

## 2021-09-27 PROCEDURE — G8754 DIAS BP LESS 90: HCPCS | Performed by: INTERNAL MEDICINE

## 2021-09-27 RX ORDER — PREDNISONE 10 MG/1
TABLET ORAL
Qty: 12 TABLET | Refills: 0 | Status: SHIPPED | OUTPATIENT
Start: 2021-09-27 | End: 2021-10-12

## 2021-09-27 NOTE — PROGRESS NOTES
Rajni Andrews is a 68 y.o. female and presents with Follow Up Chronic Condition, Hypertension, and Cholesterol Problem  Patient presents for follow-up worried about her  who had a right above-the-knee amputation and now may need left amputation due to symptomatic peripheral vascular disease but will be evaluated soon patient had the flu vaccine pneumonia vaccine shingles vaccine in the [facility] vaccine for coronavirus x2 and did well she is scheduled to follow-up with Dr. Queenie Patrick pain management and orthopedic surgeon for chronic back pain October 7 and Dr. Luther Jsaso orthopedist this coming Wednesday for left knee arthritis with bone-on-bone pathology she says she had a back MRI last week lumbar spine but I have no results right now her knee is hurting quite bad she is not able to get any relief with tramadol other nonsteroidals but in the past steroids have helped and we advised her to try a tapering dose of prednisone which was ordered last labs September 23 last year-yielded a white count of 6100 hemoglobin 12.2 glucose 115 creatinine 1.22 potassium 4.7 bilirubin not detected AST 16 cholesterol 250  hemoglobin A1c 6.3 and this was reviewed with the patient she has however lost considerable weight down to 177 pounds from 191 pounds last visit March of this year and was agreeable for having labs drawn by the office phlebotomist for the studies below assuming everything would have improved because of that blood pressure normal today-patient agreeable for Aura's colon cancer screening as she is not keen on colonoscopy she is aware of the test and that it is 93% accurate that it may miss some polyps that are in the early stages but she will consider having the test done in 3 years to catch those that might of slipped through           Review of Systems  Constitutional: negative for fevers, chills, anorexia, weight loss and fatigue,no insomnia  Eyes:   negative for visual disturbance and irritation, eye discharge, eye pain. no eye redness. ENT:   negative for tinnitus, sore throat, nasal congestion, ear pain, hoarseness, hearing loss.,no snoring. Respiratory:  negative for cough, hemoptysis, shortness of breath, wheezing,  CV:   negative for chest pain, palpitations, lower extremity edema, shortness of breath while sitting, walking or at night  GI:   negative for nausea, vomiting, diarrhea, abdominal pain,melena,constipation. Endo:               negative for polyuria, polydipsia, polyphagia, cold or heat intolerance,hair loss. Genitourinary: negative for frequency, dysuria and hematuria,urethral discharge,nocturia.straining while urination,urinary incontinence. Integument:  negative for rash and pruritus  Hematologic:  negative for easy bruising and gum/nose bleeding, enlarged nodes  Musculoskel: negative for myalgias, arthralgias, back pain, muscle weakness, joint pain, h/o fall,cramps,calf pain. Osteoarthritis changes  Neurological:  negative for headaches, dizziness, vertigo, memory problems, gait and seizures loss of consciousness,no ataxia.   Sensory neuropathy as above  Behavl/Psych: negative for feelings of anxiety, depression, mood changes ,sadness    Past Medical History:   Diagnosis Date    Abdominal pain 8/4/2020    Carotid artery occlusion 8/4/2020    Chronic low back pain 8/4/2020    Degenerative joint disease involving multiple joints 8/4/2020    Diverticulitis of colon 8/4/2020    Essential hypertension 8/4/2020    Headache 8/4/2020    Hyperlipidemia 8/4/2020    Low back strain 8/4/2020    Lower limb pain, inferior, right 8/4/2020    Pure hypercholesterolemia 8/4/2020    Seasonal allergic rhinitis 8/4/2020    Sensory neuropathy 8/4/2020    Vitamin D deficiency 8/4/2020     Past Surgical History:   Procedure Laterality Date    HX COLONOSCOPY  01/22/2013    HX KNEE REPLACEMENT Right 08/01/2016     Social History     Socioeconomic History    Marital status:      Spouse name: Not on file    Number of children: Not on file    Years of education: Not on file    Highest education level: Not on file   Tobacco Use    Smoking status: Never Smoker    Smokeless tobacco: Never Used   Substance and Sexual Activity    Alcohol use: Never     Social Determinants of Health     Financial Resource Strain:     Difficulty of Paying Living Expenses:    Food Insecurity:     Worried About Running Out of Food in the Last Year:     [address] in the Last Year:    Transportation Needs:     Lack of Transportation (Medical):  Lack of Transportation (Non-Medical):    Physical Activity:     Days of Exercise per Week:     Minutes of Exercise per Session:    Stress:     Feeling of Stress :    Social Connections:     Frequency of Communication with Friends and Family:     Frequency of Social Gatherings with Friends and Family:     Attends [employer] Services:     Active Member of Clubs or Organizations:     Attends Club or Organization Meetings:     Marital Status:      Family History   Family history unknown: Yes     Current Outpatient Medications   Medication Sig Dispense Refill    gabapentin (NEURONTIN) 300 mg capsule Take 1 Capsule by mouth two (2) times a day for 90 days. Max Daily Amount: 600 mg. 180 Capsule 0    losartan-hydroCHLOROthiazide (HYZAAR) 100-12.5 mg per tablet Take 1 Tablet by mouth daily for 90 days.  90 Tablet 0    naproxen EC (NAPROSYN EC) 500 mg EC tablet take 1 tablet by mouth TWO TIMES DAILY (WITH MEALS) FOR 90 DAYS 180 Tablet 0     Allergies   Allergen Reactions    Latex Rash    Darvocet A500 [Propoxyphene N-Acetaminophen] Nausea and Vomiting       Objective:  Visit Vitals  /66 (BP 1 Location: Right arm, BP Patient Position: Sitting, BP Cuff Size: Adult)   Pulse 67   Temp 98.3 °F (36.8 °C) (Oral)   Resp 18   Ht 5' 1\" (1.549 m)   Wt 177 lb (80.3 kg)   SpO2 96%   BMI 33.44 kg/m²       Physical Exam:   Constitutional: General Appearance: healthy-appearing and obese. Level of Distress: NAD. Ambulation: ambulating normally. Slightly overweight for height  Psychiatric: Mental Status: normal mood and affect and active and alert. Orientation: to time, place, and person. no agitation. ,normal eye contact. normal insight  Head: Head: normocephalic and atraumatic. Eyes: Pupils: PERRLA. Sclerae: non-icteric. ENMT: No lesions on external ear, no hearing loss. No lesions on external nose, sinus tenderness, or nasal discharge. Lips, Teeth, and no mouth or lip ulcers   Neck: Neck: supple, trachea midline, and no masses. Lymph Nodes: no cervical LAD. Thyroid: no enlargement or nodules and non-tender. Lungs: Respiratory effort: no dyspnea. Auscultation: no wheezing, rales/crackles, or rhonchi and breath sounds normal and good air movement  Cardiovascular: Apical Impulse: not displaced. Heart Auscultation: normal S1 and S2; no murmurs, rubs, or gallops; and RRR. Neck vessels: no carotid bruits. Pulses including femoral / pedal: normal throughout. Abdomen: Bowel Sounds: normal. Inspection and Palpation: no tenderness, guarding, or masses and soft and non-distended. Liver: non-tender and no hepatomegaly. Musculoskeletal[de-identified] Extremities: no edema or varicosities. Calf tenderness. DJD changes  Neurologic: Gait and Station: normal gait and station. Motor Strength normal right and left. Sensory and cerebellar intact. Skin: Inspection and palpation: no rash, lesions, or ulcer.        Results for orders placed or performed in visit on 09/23/20   HEMOGLOBIN A1C WITH EAG   Result Value Ref Range    Hemoglobin A1c 6.3 (H) 4.8 - 5.6 %    Estimated average glucose 134 mg/dL   LIPID PANEL   Result Value Ref Range    Cholesterol, total 250 (H) 100 - 199 mg/dL    Triglyceride 159 (H) 0 - 149 mg/dL    HDL Cholesterol 41 >39 mg/dL    VLDL, calculated 29 5 - 40 mg/dL    LDL, calculated 180 (H) 0 - 99 mg/dL   CBC WITH AUTOMATED DIFF   Result Value Ref Range    WBC 6.1 3.4 - 10.8 x10E3/uL    RBC 3. 93 3.77 - 5.28 x10E6/uL    HGB 12.2 11.1 - 15.9 g/dL    HCT 35.8 34.0 - 46.6 %    MCV 91 79 - 97 fL    MCH 31.0 26.6 - 33.0 pg    MCHC 34.1 31.5 - 35.7 g/dL    RDW 13.4 11.7 - 15.4 %    PLATELET 854 970 - 100 x10E3/uL    NEUTROPHILS 59 Not Estab. %    Lymphocytes 29 Not Estab. %    MONOCYTES 7 Not Estab. %    EOSINOPHILS 4 Not Estab. %    BASOPHILS 1 Not Estab. %    ABS. NEUTROPHILS 3.6 1.4 - 7.0 x10E3/uL    Abs Lymphocytes 1.7 0.7 - 3.1 x10E3/uL    ABS. MONOCYTES 0.4 0.1 - 0.9 x10E3/uL    ABS. EOSINOPHILS 0.3 0.0 - 0.4 x10E3/uL    ABS. BASOPHILS 0.1 0.0 - 0.2 x10E3/uL    IMMATURE GRANULOCYTES 0 Not Estab. %    ABS. IMM. GRANS. 0.0 0.0 - 0.1 [id number]/BX   METABOLIC PANEL, COMPREHENSIVE   Result Value Ref Range    Glucose 115 (H) 65 - 99 mg/dL    BUN 28 (H) 8 - 27 mg/dL    Creatinine 1.22 (H) 0.57 - 1.00 mg/dL    GFR est non-AA 43 (L) >59 mL/min/1.73    GFR est AA 49 (L) >59 mL/min/1.73    BUN/Creatinine ratio 23 12 - 28    Sodium 143 134 - 144 mmol/L    Potassium 4.7 3.5 - 5.2 mmol/L    Chloride 106 96 - 106 mmol/L    CO2 24 20 - 29 mmol/L    Calcium 10.3 8.7 - 10.3 mg/dL    Protein, total 6.9 6.0 - 8.5 g/dL    Albumin 4.6 3.7 - 4.7 g/dL    GLOBULIN, TOTAL 2.3 1.5 - 4.5 g/dL    A-G Ratio 2.0 1.2 - 2.2    Bilirubin, total <0.2 0.0 - 1.2 mg/dL    Alk. phosphatase 82 39 - 117 IU/L    AST (SGOT) 16 0 - 40 IU/L    ALT (SGPT) 11 0 - 32 IU/L       Assessment/Plan:    ICD-10-CM ICD-9-CM    1. Essential hypertension  I10 401.9 CBC WITH AUTOMATED DIFF      METABOLIC PANEL, COMPREHENSIVE      TSH 3RD GENERATION      T4, FREE   2. Diverticulitis of colon  K57.32 562.11    3. Sensory neuropathy  G62.9 356.9    4. Other osteoarthritis involving multiple joints  M15.8 715.89    5. Pure hypertriglyceridemia  E78.1 272.1 LIPID PANEL   6. Hyperglycemia  R73.9 790.29 HEMOGLOBIN A1C WITH EAG   7. Pure hypercholesterolemia  E78.00 272.0    8.  Colon cancer screening  Z12.11 V76.51 COLOGUARD TEST (FECAL DNA COLORECTAL CANCER SCREENING) 9. Primary osteoarthritis of left knee  M17.12 715.16      Orders Placed This Encounter    CBC WITH AUTOMATED DIFF    METABOLIC PANEL, COMPREHENSIVE    TSH 3RD GENERATION    T4, FREE    HEMOGLOBIN A1C WITH EAG    LIPID PANEL    COLOGUARD TEST (FECAL DNA COLORECTAL CANCER SCREENING)       lose weight, increase physical activity, continue present plan, have labs drawn prior to ROV, call if any problems    There are no Patient Instructions on file for this visit. Follow-up and Dispositions    · Return in about 6 months (around 3/27/2022).

## 2021-09-27 NOTE — PROGRESS NOTES
Chief Complaint   Patient presents with    Follow Up Chronic Condition    Hypertension    Cholesterol Problem     Visit Vitals  /66 (BP 1 Location: Right arm, BP Patient Position: Sitting, BP Cuff Size: Adult)   Pulse 67   Temp 98.3 °F (36.8 °C) (Oral)   Resp 18   Ht 5' 1\" (1.549 m)   Wt 177 lb (80.3 kg)   SpO2 96%   BMI 33.44 kg/m²     1. Have you been to the ER, urgent care clinic since your last visit? Hospitalized since your last visit? No    2. Have you seen or consulted any other health care providers outside of the [address] [place] [address] since your last visit? Include any pap smears or colon screening.  No

## 2021-10-12 RX ORDER — PREDNISONE 10 MG/1
TABLET ORAL
Qty: 12 TABLET | Refills: 0 | Status: SHIPPED | OUTPATIENT
Start: 2021-10-12

## 2021-10-19 LAB
ALBUMIN SERPL-MCNC: 4.4 G/DL (ref 3.7–4.7)
ALBUMIN/GLOB SERPL: 1.6 {RATIO} (ref 1.2–2.2)
ALP SERPL-CCNC: 78 IU/L (ref 44–121)
ALT SERPL-CCNC: 12 IU/L (ref 0–32)
AST SERPL-CCNC: 16 IU/L (ref 0–40)
BASOPHILS # BLD AUTO: 0.1 X10E3/UL (ref 0–0.2)
BASOPHILS NFR BLD AUTO: 1 %
BILIRUB SERPL-MCNC: 0.3 MG/DL (ref 0–1.2)
BUN SERPL-MCNC: 12 MG/DL (ref 8–27)
BUN/CREAT SERPL: 11 (ref 12–28)
CALCIUM SERPL-MCNC: 10 MG/DL (ref 8.7–10.3)
CHLORIDE SERPL-SCNC: 103 MMOL/L (ref 96–106)
CHOLEST SERPL-MCNC: 268 MG/DL (ref 100–199)
CO2 SERPL-SCNC: 22 MMOL/L (ref 20–29)
CREAT SERPL-MCNC: 1.08 MG/DL (ref 0.57–1)
EOSINOPHIL # BLD AUTO: 0.2 X10E3/UL (ref 0–0.4)
EOSINOPHIL NFR BLD AUTO: 3 %
ERYTHROCYTE [DISTWIDTH] IN BLOOD BY AUTOMATED COUNT: 13 % (ref 11.7–15.4)
EST. AVERAGE GLUCOSE BLD GHB EST-MCNC: 137 MG/DL
GLOBULIN SER CALC-MCNC: 2.7 G/DL (ref 1.5–4.5)
GLUCOSE SERPL-MCNC: 120 MG/DL (ref 65–99)
HBA1C MFR BLD: 6.4 % (ref 4.8–5.6)
HCT VFR BLD AUTO: 37.7 % (ref 34–46.6)
HDLC SERPL-MCNC: 38 MG/DL
HGB BLD-MCNC: 13.3 G/DL (ref 11.1–15.9)
IMM GRANULOCYTES # BLD AUTO: 0 X10E3/UL (ref 0–0.1)
IMM GRANULOCYTES NFR BLD AUTO: 0 %
LDLC SERPL CALC-MCNC: 169 MG/DL (ref 0–99)
LYMPHOCYTES # BLD AUTO: 1.8 X10E3/UL (ref 0.7–3.1)
LYMPHOCYTES NFR BLD AUTO: 23 %
MCH RBC QN AUTO: 33.3 PG (ref 26.6–33)
MCHC RBC AUTO-ENTMCNC: 35.3 G/DL (ref 31.5–35.7)
MCV RBC AUTO: 94 FL (ref 79–97)
MONOCYTES # BLD AUTO: 0.4 X10E3/UL (ref 0.1–0.9)
MONOCYTES NFR BLD AUTO: 6 %
NEUTROPHILS # BLD AUTO: 5.3 X10E3/UL (ref 1.4–7)
NEUTROPHILS NFR BLD AUTO: 67 %
PLATELET # BLD AUTO: 307 X10E3/UL (ref 150–450)
POTASSIUM SERPL-SCNC: 4.7 MMOL/L (ref 3.5–5.2)
PROT SERPL-MCNC: 7.1 G/DL (ref 6–8.5)
RBC # BLD AUTO: 4 X10E6/UL (ref 3.77–5.28)
SODIUM SERPL-SCNC: 139 MMOL/L (ref 134–144)
T4 FREE SERPL-MCNC: 1.07 NG/DL (ref 0.82–1.77)
TRIGL SERPL-MCNC: 319 MG/DL (ref 0–149)
TSH SERPL DL<=0.005 MIU/L-ACNC: 1.46 UIU/ML (ref 0.45–4.5)
VLDLC SERPL CALC-MCNC: 61 MG/DL (ref 5–40)
WBC # BLD AUTO: 7.9 X10E3/UL (ref 3.4–10.8)

## 2021-10-27 RX ORDER — ROSUVASTATIN CALCIUM 5 MG/1
5 TABLET, COATED ORAL
Qty: 90 TABLET | Refills: 0 | Status: SHIPPED | OUTPATIENT
Start: 2021-10-27 | End: 2022-01-25

## 2021-10-27 NOTE — PROGRESS NOTES
Patient notified of elevated cholesterol and would like for you to order her cholesterol medication.

## 2021-12-20 RX ORDER — LOSARTAN POTASSIUM AND HYDROCHLOROTHIAZIDE 12.5; 1 MG/1; MG/1
TABLET ORAL
Qty: 90 TABLET | Refills: 0 | Status: SHIPPED | OUTPATIENT
Start: 2021-12-20 | End: 2022-03-25 | Stop reason: SDUPTHER

## 2022-01-25 RX ORDER — ROSUVASTATIN CALCIUM 5 MG/1
TABLET, COATED ORAL
Qty: 90 TABLET | Refills: 0 | Status: SHIPPED | OUTPATIENT
Start: 2022-01-25

## 2022-02-05 DIAGNOSIS — G62.9 SENSORIMOTOR NEUROPATHY: ICD-10-CM

## 2022-02-10 RX ORDER — GABAPENTIN 300 MG/1
300 CAPSULE ORAL 2 TIMES DAILY
Qty: 180 CAPSULE | Refills: 0 | Status: SHIPPED | OUTPATIENT
Start: 2022-02-10 | End: 2022-05-11

## 2022-02-10 RX ORDER — BUTALBITAL, ACETAMINOPHEN AND CAFFEINE 50; 325; 40 MG/1; MG/1; MG/1
TABLET ORAL
Qty: 60 TABLET | Refills: 0 | Status: SHIPPED | OUTPATIENT
Start: 2022-02-10

## 2022-03-17 ENCOUNTER — TELEPHONE (OUTPATIENT)
Dept: INTERNAL MEDICINE CLINIC | Age: 82
End: 2022-03-17

## 2022-03-17 NOTE — TELEPHONE ENCOUNTER
Dr. Hodge Ask patient    [address] [address] faxed request for    losartan-hydroCHLOROthiazide [facility]) 100-12.5 mg per tablet  Sig: take 1 tablet by mouth daily    LOV 9/27/2021 Mata    Called pt lmvm per letter sent out Dr. Hodge Ask retired end of February. Your were last seen 9/27/21 and we received an refill request from [facility]. Please call the office and give us the name of your new pcp or schedule an appointment.

## 2022-03-18 PROBLEM — S39.012A LOW BACK STRAIN: Status: ACTIVE | Noted: 2020-08-04

## 2022-03-18 PROBLEM — M79.604 LOWER LIMB PAIN, INFERIOR, RIGHT: Status: ACTIVE | Noted: 2020-08-04

## 2022-03-18 PROBLEM — I10 ESSENTIAL HYPERTENSION: Status: ACTIVE | Noted: 2020-08-04

## 2022-03-18 PROBLEM — R51.9 HEADACHE: Status: ACTIVE | Noted: 2020-08-04

## 2022-03-18 PROBLEM — R10.9 ABDOMINAL PAIN: Status: ACTIVE | Noted: 2020-08-04

## 2022-03-18 PROBLEM — I65.29 CAROTID ARTERY OCCLUSION: Status: ACTIVE | Noted: 2020-08-04

## 2022-03-19 PROBLEM — M15.9 DEGENERATIVE JOINT DISEASE INVOLVING MULTIPLE JOINTS: Status: ACTIVE | Noted: 2020-08-04

## 2022-03-19 PROBLEM — J30.2 SEASONAL ALLERGIC RHINITIS: Status: ACTIVE | Noted: 2020-08-04

## 2022-03-19 PROBLEM — E78.00 PURE HYPERCHOLESTEROLEMIA: Status: ACTIVE | Noted: 2020-08-04

## 2022-03-19 PROBLEM — G89.29 CHRONIC LOW BACK PAIN: Status: ACTIVE | Noted: 2020-08-04

## 2022-03-19 PROBLEM — E55.9 VITAMIN D DEFICIENCY: Status: ACTIVE | Noted: 2020-08-04

## 2022-03-19 PROBLEM — M54.50 CHRONIC LOW BACK PAIN: Status: ACTIVE | Noted: 2020-08-04

## 2022-03-19 PROBLEM — G62.9 SENSORY NEUROPATHY: Status: ACTIVE | Noted: 2020-08-04

## 2022-03-19 PROBLEM — E78.5 HYPERLIPIDEMIA: Status: ACTIVE | Noted: 2020-08-04

## 2022-03-25 RX ORDER — LOSARTAN POTASSIUM AND HYDROCHLOROTHIAZIDE 12.5; 1 MG/1; MG/1
1 TABLET ORAL DAILY
Qty: 30 TABLET | Refills: 2 | Status: SHIPPED | OUTPATIENT
Start: 2022-03-25 | End: 2022-06-23

## 2023-05-26 RX ORDER — PREDNISONE 10 MG/1
TABLET ORAL
COMMUNITY
Start: 2021-10-12

## 2023-05-26 RX ORDER — ROSUVASTATIN CALCIUM 5 MG/1
1 TABLET, COATED ORAL NIGHTLY
COMMUNITY
Start: 2022-01-25

## 2023-05-26 RX ORDER — NAPROXEN 500 MG/1
TABLET ORAL
COMMUNITY
Start: 2021-07-27

## 2023-05-26 RX ORDER — BUTALBITAL, ACETAMINOPHEN AND CAFFEINE 50; 325; 40 MG/1; MG/1; MG/1
TABLET ORAL
COMMUNITY
Start: 2022-02-10